# Patient Record
Sex: MALE | Race: WHITE | URBAN - METROPOLITAN AREA
[De-identification: names, ages, dates, MRNs, and addresses within clinical notes are randomized per-mention and may not be internally consistent; named-entity substitution may affect disease eponyms.]

---

## 2017-11-09 ENCOUNTER — OFFICE VISIT (OUTPATIENT)
Dept: FAMILY MEDICINE | Facility: CLINIC | Age: 47
End: 2017-11-09
Payer: COMMERCIAL

## 2017-11-09 VITALS
TEMPERATURE: 98.3 F | HEIGHT: 70 IN | HEART RATE: 90 BPM | WEIGHT: 206 LBS | DIASTOLIC BLOOD PRESSURE: 85 MMHG | OXYGEN SATURATION: 96 % | SYSTOLIC BLOOD PRESSURE: 125 MMHG | BODY MASS INDEX: 29.49 KG/M2

## 2017-11-09 DIAGNOSIS — F10.21 PERSONAL HISTORY OF ALCOHOLISM (H): ICD-10-CM

## 2017-11-09 DIAGNOSIS — N52.8 OTHER MALE ERECTILE DYSFUNCTION: ICD-10-CM

## 2017-11-09 DIAGNOSIS — Z00.00 ROUTINE GENERAL MEDICAL EXAMINATION AT A HEALTH CARE FACILITY: Primary | ICD-10-CM

## 2017-11-09 DIAGNOSIS — Z13.6 CARDIOVASCULAR SCREENING; LDL GOAL LESS THAN 160: ICD-10-CM

## 2017-11-09 DIAGNOSIS — Z23 NEED FOR IMMUNIZATION AGAINST INFLUENZA: ICD-10-CM

## 2017-11-09 PROBLEM — N52.9 ED (ERECTILE DYSFUNCTION): Status: ACTIVE | Noted: 2017-11-09

## 2017-11-09 LAB
ALBUMIN SERPL-MCNC: 3.9 G/DL (ref 3.4–5)
ALP SERPL-CCNC: 80 U/L (ref 40–150)
ALT SERPL W P-5'-P-CCNC: 33 U/L (ref 0–70)
ANION GAP SERPL CALCULATED.3IONS-SCNC: 8 MMOL/L (ref 3–14)
AST SERPL W P-5'-P-CCNC: 19 U/L (ref 0–45)
BASOPHILS # BLD AUTO: 0.1 10E9/L (ref 0–0.2)
BASOPHILS NFR BLD AUTO: 1 %
BILIRUB SERPL-MCNC: 0.8 MG/DL (ref 0.2–1.3)
BUN SERPL-MCNC: 14 MG/DL (ref 7–30)
CALCIUM SERPL-MCNC: 8.9 MG/DL (ref 8.5–10.1)
CHLORIDE SERPL-SCNC: 102 MMOL/L (ref 94–109)
CHOLEST SERPL-MCNC: 200 MG/DL
CO2 SERPL-SCNC: 29 MMOL/L (ref 20–32)
CREAT SERPL-MCNC: 1.2 MG/DL (ref 0.66–1.25)
DIFFERENTIAL METHOD BLD: ABNORMAL
EOSINOPHIL # BLD AUTO: 0.2 10E9/L (ref 0–0.7)
EOSINOPHIL NFR BLD AUTO: 2 %
ERYTHROCYTE [DISTWIDTH] IN BLOOD BY AUTOMATED COUNT: 13.1 % (ref 10–15)
GFR SERPL CREATININE-BSD FRML MDRD: 64 ML/MIN/1.7M2
GLUCOSE SERPL-MCNC: 85 MG/DL (ref 70–99)
HCT VFR BLD AUTO: 44.9 % (ref 40–53)
HDLC SERPL-MCNC: 56 MG/DL
HGB BLD-MCNC: 15.6 G/DL (ref 13.3–17.7)
LDLC SERPL CALC-MCNC: 130 MG/DL
LYMPHOCYTES # BLD AUTO: 6.2 10E9/L (ref 0.8–5.3)
LYMPHOCYTES NFR BLD AUTO: 57 %
MCH RBC QN AUTO: 30.8 PG (ref 26.5–33)
MCHC RBC AUTO-ENTMCNC: 34.7 G/DL (ref 31.5–36.5)
MCV RBC AUTO: 89 FL (ref 78–100)
MONOCYTES # BLD AUTO: 0.6 10E9/L (ref 0–1.3)
MONOCYTES NFR BLD AUTO: 6 %
NEUTROPHILS # BLD AUTO: 3.6 10E9/L (ref 1.6–8.3)
NEUTROPHILS NFR BLD AUTO: 34 %
NONHDLC SERPL-MCNC: 144 MG/DL
PLATELET # BLD AUTO: 194 10E9/L (ref 150–450)
POTASSIUM SERPL-SCNC: 4.5 MMOL/L (ref 3.4–5.3)
PROT SERPL-MCNC: 7.2 G/DL (ref 6.8–8.8)
RBC # BLD AUTO: 5.07 10E12/L (ref 4.4–5.9)
SODIUM SERPL-SCNC: 139 MMOL/L (ref 133–144)
TRIGL SERPL-MCNC: 69 MG/DL
VARIANT LYMPHS BLD QL SMEAR: PRESENT
WBC # BLD AUTO: 10.7 10E9/L (ref 4–11)

## 2017-11-09 PROCEDURE — 90471 IMMUNIZATION ADMIN: CPT | Performed by: INTERNAL MEDICINE

## 2017-11-09 PROCEDURE — 85025 COMPLETE CBC W/AUTO DIFF WBC: CPT | Performed by: INTERNAL MEDICINE

## 2017-11-09 PROCEDURE — 80061 LIPID PANEL: CPT | Performed by: INTERNAL MEDICINE

## 2017-11-09 PROCEDURE — 80053 COMPREHEN METABOLIC PANEL: CPT | Performed by: INTERNAL MEDICINE

## 2017-11-09 PROCEDURE — 90686 IIV4 VACC NO PRSV 0.5 ML IM: CPT | Performed by: INTERNAL MEDICINE

## 2017-11-09 PROCEDURE — 36415 COLL VENOUS BLD VENIPUNCTURE: CPT | Performed by: INTERNAL MEDICINE

## 2017-11-09 PROCEDURE — 99386 PREV VISIT NEW AGE 40-64: CPT | Mod: 25 | Performed by: INTERNAL MEDICINE

## 2017-11-09 RX ORDER — SILDENAFIL 50 MG/1
50 TABLET, FILM COATED ORAL DAILY PRN
Qty: 12 TABLET | Refills: 11 | Status: SHIPPED | OUTPATIENT
Start: 2017-11-09 | End: 2018-02-27

## 2017-11-09 NOTE — PATIENT INSTRUCTIONS
Preventive Health Recommendations  Male Ages 40 to 49    Yearly exam:             See your health care provider every year in order to  o   Review health changes.   o   Discuss preventive care.    o   Review your medicines if your doctor has prescribed any.    You should be tested each year for STDs (sexually transmitted diseases) if you re at risk.     Have a cholesterol test every 5 years.     Have a colonoscopy (test for colon cancer) if someone in your family has had colon cancer or polyps before age 50.     After age 45, have a diabetes test (fasting glucose). If you are at risk for diabetes, you should have this test every 3 years.      Talk with your health care provider about whether or not a prostate cancer screening test (PSA) is right for you.    Shots: Get a flu shot each year. Get a tetanus shot every 10 years.     Nutrition:    Eat at least 5 servings of fruits and vegetables daily.     Eat whole-grain bread, whole-wheat pasta and brown rice instead of white grains and rice.     Talk to your provider about Calcium and Vitamin D.     Lifestyle    Exercise for at least 150 minutes a week (30 minutes a day, 5 days a week). This will help you control your weight and prevent disease.     Limit alcohol to one drink per day.     No smoking.     Wear sunscreen to prevent skin cancer.     See your dentist every six months for an exam and cleaning.      At [facility], we strive to deliver an exceptional experience to you, every time we see you.  If you receive a survey in the mail, please send us back your thoughts. We really do value your feedback.    Based on your medical history, these are the current health maintenance/preventive care services that you are due for (some may have been done at this visit.)  Health Maintenance Due   Topic Date Due     TETANUS IMMUNIZATION (SYSTEM ASSIGNED)  04/20/1986     LIPID SCREEN Q5 YR MALE (SYSTEM ASSIGNED)  04/20/2003     INFLUENZA VACCINE (SYSTEM  ASSIGNED)  09/01/2017         Suggested websites for health information:  Www.fairview.org : Up to date and easily searchable information on multiple topics.  Www.medlineplus.gov : medication info, interactive tutorials, watch real surgeries online  Www.familydoctor.org : good info from the Academy of Family Physicians  Www.cdc.gov : public health info, travel advisories, epidemics (H1N1)  Www.aap.org : children's health info, normal development, vaccinations  Www.health.[facility].[place].us : [place] dept of health, public health issues in [place], N1N1    Your care team:                            Family Medicine Internal Medicine   MD Casimiro Navas, MD Humaira Li PA-C, MD Pediatrics   HORACIO Monroy, PORFIRIO Armando APRN CNP   MD Molly Jansen MD Deborah Mielke, MD Kim Thein, APRN CNP      Clinic hours: Monday - Thursday 7 am-7 pm; Fridays 7 am-5 pm.   Urgent care: Monday - Friday 11 am-9 pm; Saturday and Sunday 9 am-5 pm.  Pharmacy : Monday -Thursday 8 am-8 pm; Friday 8 am-6 pm; Saturday and Sunday 9 am-5 pm.     Clinic: [phone]   Pharmacy: [phone]

## 2017-11-09 NOTE — NURSING NOTE
Injectable Influenza Immunization Documentation    1.  Are you sick today? (Fever of 100.5 or higher on the day of the clinic)   No    2.  Have you ever had Guillain-[place] Syndrome within 6 weeks of an influenza vaccionation?  No    3. Do you have a life-threatening allergy to eggs?  No    4. Do you have a life-threatening allergy to a component of the vaccine? May include antibiotics, gelatin or latex.  No     5. Have you ever had a reaction to a dose of flu vaccine that needed immediate medical attention?  No     Form completed by Andrew Chacko MA

## 2017-11-09 NOTE — NURSING NOTE
"Chief Complaint   Patient presents with     Physical       Initial /85 (BP Location: Left arm, Patient Position: Chair, Cuff Size: Adult Regular)  Pulse 90  Temp 98.3  F (36.8  C) (Oral)  Ht 5' 10\" (1.778 m)  Wt 206 lb (93.4 kg)  SpO2 96%  BMI 29.56 kg/m2 Estimated body mass index is 29.56 kg/(m^2) as calculated from the following:    Height as of this encounter: 5' 10\" (1.778 m).    Weight as of this encounter: 206 lb (93.4 kg).  Medication Reconciliation: complete     Andrew Chacko MA      "

## 2017-11-09 NOTE — MR AVS SNAPSHOT
After Visit Summary   11/9/2017    Jean-Claude Meraz    MRN: [id number]           Patient Information     Date Of Birth          1970        Visit Information        Provider Department      11/9/2017 9:00 AM Casimiro Mattson MD [facility]        Today's Diagnoses     Routine general medical examination at a health care facility    -  1    Other male erectile dysfunction        CARDIOVASCULAR SCREENING; LDL GOAL LESS THAN 160          Care Instructions      Preventive Health Recommendations  Male Ages 40 to 49    Yearly exam:             See your health care provider every year in order to  o   Review health changes.   o   Discuss preventive care.    o   Review your medicines if your doctor has prescribed any.    You should be tested each year for STDs (sexually transmitted diseases) if you re at risk.     Have a cholesterol test every 5 years.     Have a colonoscopy (test for colon cancer) if someone in your family has had colon cancer or polyps before age 50.     After age 45, have a diabetes test (fasting glucose). If you are at risk for diabetes, you should have this test every 3 years.      Talk with your health care provider about whether or not a prostate cancer screening test (PSA) is right for you.    Shots: Get a flu shot each year. Get a tetanus shot every 10 years.     Nutrition:    Eat at least 5 servings of fruits and vegetables daily.     Eat whole-grain bread, whole-wheat pasta and brown rice instead of white grains and rice.     Talk to your provider about Calcium and Vitamin D.     Lifestyle    Exercise for at least 150 minutes a week (30 minutes a day, 5 days a week). This will help you control your weight and prevent disease.     Limit alcohol to one drink per day.     No smoking.     Wear sunscreen to prevent skin cancer.     See your dentist every six months for an exam and cleaning.      At [facility], we strive to deliver an  exceptional experience to you, every time we see you.  If you receive a survey in the mail, please send us back your thoughts. We really do value your feedback.    Based on your medical history, these are the current health maintenance/preventive care services that you are due for (some may have been done at this visit.)  Health Maintenance Due   Topic Date Due     TETANUS IMMUNIZATION (SYSTEM ASSIGNED)  04/20/1986     LIPID SCREEN Q5 YR MALE (SYSTEM ASSIGNED)  04/20/2003     INFLUENZA VACCINE (SYSTEM ASSIGNED)  09/01/2017         Suggested websites for health information:  Www.Re[employer].org : Up to date and easily searchable information on multiple topics.  Www.medlineplus.gov : medication info, interactive tutorials, watch real surgeries online  Www.familydoctor.org : good info from the Academy of Family Physicians  Www.cdc.gov : public health info, travel advisories, epidemics (H1N1)  Www.aap.org : children's health info, normal development, vaccinations  Www.health.[facility].[place].us : [place] dept of health, public health issues in [place], N1N1    Your care team:                            Family Medicine Internal Medicine   MD Casimiro Navas MD Shantel Branch-Fleming, MD Katya Georgiev PA-C Nam Ho, MD Pediatrics   HORACIO Monroy, PORFIRIO Armando APROSMANI CNP   MD Molly Jansen MD Deborah Mielke, MD Kim Thein, APRN [facility]      Clinic hours: Monday - Thursday 7 am-7 pm; Fridays 7 am-5 pm.   Urgent care: Monday - Friday 11 am-9 pm; Saturday and Sunday 9 am-5 pm.  Pharmacy : Monday -Thursday 8 am-8 pm; Friday 8 am-6 pm; Saturday and Sunday 9 am-5 pm.     Clinic: [phone]   Pharmacy: [phone]            Follow-ups after your visit        Who to contact     If you have questions or need follow up information about today's clinic visit or your schedule please contact [facility] directly at [phone].  Normal or non-critical lab and  "imaging results will be communicated to you by MyChart, letter or phone within 4 business days after the clinic has received the results. If you do not hear from us within 7 days, please contact the clinic through [employer] or phone. If you have a critical or abnormal lab result, we will notify you by phone as soon as possible.  Submit refill requests through [facility] [facility] or call your pharmacy and they will forward the refill request to us. Please allow 3 business days for your refill to be completed.          Additional Information About Your Visit        My Study [employer] Information     [facility] lets you send messages to your doctor, view your test results, renew your prescriptions, schedule appointments and more. To sign up, go to [email or web address] . Click on \"Log in\" on the left side of the screen, which will take you to the Welcome page. Then click on \"Sign up Now\" on the right side of the page.     You will be asked to enter the access code listed below, as well as some personal information. Please follow the directions to create your username and password.     Your access code is: [email or web address]  Expires: 2018  9:39 AM     Your access code will  in 90 days. If you need help or a new code, please call your [facility] clinic or [phone].        Care EveryWhere ID     This is your Care EveryWhere ID. This could be used by other organizations to access your [facility] medical records  [id number]        Your Vitals Were     Pulse Temperature Height Pulse Oximetry BMI (Body Mass Index)       90 98.3  F (36.8  C) (Oral) 5' 10\" (1.778 m) 96% 29.56 kg/m2        Blood Pressure from Last 3 Encounters:   17 125/85    Weight from Last 3 Encounters:   17 206 lb (93.4 kg)              We Performed the Following     CBC with platelets and differential     Comprehensive metabolic panel (BMP + Alb, Alk Phos, ALT, AST, Total. Bili, TP)     Lipid Profile (Chol, Trig, HDL, LDL calc)          Today's Medication Changes "          These changes are accurate as of: 11/9/17  9:39 AM.  If you have any questions, ask your nurse or doctor.               Start taking these medicines.        Dose/Directions    sildenafil 50 MG tablet   Commonly known as:  VIAGRA   Used for:  Other male erectile dysfunction   Started by:  Casimiro Mattson MD        Dose:  50 mg   Take 1 tablet (50 mg) by mouth daily as needed 30 min to 4 hrs before sex. Do not use with nitroglycerin, terazosin or doxazosin.   Quantity:  12 tablet   Refills:  11            Where to get your medicines      These medications were sent to [place] [address] [facility] [place] [address]  [address] [facility] [place] [address]     Phone:  [phone]     sildenafil 50 MG tablet                Primary Care Provider Office Phone # Fax #    Casimiro Mattson [phone]       [address] [place]  [place] [facility] [address]        Equal Access to Services     [facility] Joseph[facility] : Hadii cornell ku hadasho Soomaali, waaxda luqadaha, qaybta kaalmada adeegyada, geno chang hayaly klein . So [place] [phone].    ATENCIÓN: Si habla español, tiene a luque disposición servicios gratuitos de asistencia lingüística. Llame al [phone].    We comply with applicable federal civil rights laws and [place] laws. We do not discriminate on the basis of race, color, national origin, age, disability, sex, sexual orientation, or gender identity.            Thank you!     Thank you for choosing [facility]  for your care. Our goal is always to provide you with excellent care. Hearing back from our patients is one way we can continue to improve our services. Please take a few minutes to complete the written survey that you may receive in the mail after your visit with us. Thank you!             Your Updated Medication List - Protect others around you: Learn how to safely use, store and throw away your medicines at www.disposemymeds.org.          This  list is accurate as of: 11/9/17  9:39 AM.  Always use your most recent med list.                   Brand Name Dispense Instructions for use Diagnosis    sildenafil 50 MG tablet    VIAGRA    12 tablet    Take 1 tablet (50 mg) by mouth daily as needed 30 min to 4 hrs before sex. Do not use with nitroglycerin, terazosin or doxazosin.    Other male erectile dysfunction

## 2017-11-09 NOTE — PROGRESS NOTES
SUBJECTIVE:   CC: Jean-Claude Meraz is an 47 year old male who presents for preventative health visit.     Healthy Habits:    Do you get at least three servings of calcium containing foods daily (dairy, green leafy vegetables, etc.)? yes    Amount of exercise or daily activities, outside of work: 0 day(s) per week    Problems taking medications regularly No    Medication side effects: No    Have you had an eye exam in the past two years? yes    Do you see a dentist twice per year? 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 2017 (rehab)  -ADLs intact  -still has numbness at right thigh (lateral)  -chronic lung issues-susceptible to bronchitis and pneumonia during fall season  -but doesn't affect his running.        -------------------------------------    Today's PHQ-2 Score:   PHQ-2 ( 1999 Pfizer) 11/9/2017   Q1: Little interest or pleasure in doing things 0   Q2: Feeling down, depressed or hopeless 0   PHQ-2 Score 0         Abuse: Current or Past(Physical, Sexual or Emotional)- No  Do you feel safe in your environment - Yes  Social History   Substance Use Topics     Smoking status: Not on file     Smokeless tobacco: Not on file     Alcohol use Not on file     The patient does not drink >3 drinks per day nor >7 drinks per week.    Last PSA: No results found for: PSA    Reviewed orders with patient. Reviewed health maintenance and updated orders accordingly - Yes  Labs reviewed in EPIC  BP Readings from Last 3 Encounters:   11/09/17 125/85    Wt Readings from Last 3 Encounters:   11/09/17 206 lb (93.4 kg)                  Patient Active Problem List   Diagnosis     ED (erectile dysfunction)     History of colonic polyps     Lumbar back pain with radiculopathy affecting right  lower extremity     Personal history of alcoholism (H)     History reviewed. No pertinent surgical history.    Social History   Substance Use Topics     Smoking status: Never Smoker     Smokeless tobacco: Current User     Types: Chew     Alcohol use No     History reviewed. No pertinent family history.      Current Outpatient Prescriptions   Medication Sig Dispense Refill     sildenafil (VIAGRA) 50 MG tablet Take 1 tablet (50 mg) by mouth daily as needed 30 min to 4 hrs before sex. Do not use with nitroglycerin, terazosin or doxazosin. 12 tablet 11     Not on File  Recent Labs   Lab Test  11/09/17   0943   LDL  130*   HDL  56   TRIG  69   ALT  33   CR  1.20   GFRESTIMATED  64   GFRESTBLACK  78   POTASSIUM  4.5              Reviewed and updated as needed this visit by clinical staff         Reviewed and updated as needed this visit by Provider            ROS:  C: NEGATIVE for fever, chills, change in weight  I: NEGATIVE for worrisome rashes, moles or lesions  E: NEGATIVE for vision changes or irritation  ENT: NEGATIVE for ear, mouth and throat problems  R: NEGATIVE for significant cough or SOB  CV: NEGATIVE for chest pain, palpitations or peripheral edema  GI: NEGATIVE for nausea, abdominal pain, heartburn, or change in bowel habits   male: negative for dysuria, hematuria, decreased urinary stream, erectile dysfunction, urethral discharge  MUSCULOSKELETAL:As above.  N: NEGATIVE for weakness, dizziness or paresthesias  E: NEGATIVE for temperature intolerance, skin/hair changes  H: NEGATIVE for bleeding problems  P: NEGATIVE for changes in mood or affect    OBJECTIVE:   /85 (BP Location: Left arm, Patient Position: Chair, Cuff Size: Adult Regular)  EXAM:  GENERAL: healthy, alert and no distress  EYES: Eyes grossly normal to inspection, PERRL and conjunctivae and sclerae normal  HENT: ear canals and TM's normal, nose and mouth without ulcers or lesions  NECK: no adenopathy, no asymmetry, masses, or scars and  thyroid normal to palpation  RESP: lungs clear to auscultation - no rales, rhonchi or wheezes  CV: regular rate and rhythm, normal S1 S2, no S3 or S4, no murmur, click or rub, no peripheral edema and peripheral pulses strong  ABDOMEN: soft, nontender, no hepatosplenomegaly, no masses and bowel sounds normal  MS: no gross musculoskeletal defects noted, no edema  SKIN: no suspicious lesions or rashes  NEURO: Normal strength and tone, mentation intact and speech normal  PSYCH: mentation appears normal, affect normal/bright    ASSESSMENT/PLAN:   (Z00.00) Routine general medical examination at a health care facility  (primary encounter diagnosis)  Comment:   Plan: Comprehensive metabolic panel (BMP + Alb, Alk         Phos, ALT, AST, Total. Bili, TP), CBC with         platelets and differential            (N52.8) Other male erectile dysfunction  Comment:   Plan: sildenafil (VIAGRA) 50 MG tablet            (Z13.6) CARDIOVASCULAR SCREENING; LDL GOAL LESS THAN 160  Comment:   Plan: Lipid Profile (Chol, Trig, HDL, LDL calc)            (Z23) Need for immunization against influenza  Comment:   Plan: HC FLU VAC PRESRV FREE QUAD SPLIT VIR 3+YRS IM,        VACCINE ADMINISTRATION, INITIAL            (F10.21) Personal history of alcoholism (H)  Comment: Secondary to chronic low back pain and radiculopathy.  Plan: Patient is currently sober and does not take any medication for maintain abstinence.    COUNSELING:  Reviewed preventive health counseling, as reflected in patient instructions  Special attention given to:        Regular exercise       Healthy diet/nutrition    BP Screening:   Last 3 BP Readings:    BP Readings from Last 3 Encounters:   11/09/17 125/85       The following was recommended to the patient:  Re-screen BP within a year and recommended lifestyle modifications       has no tobacco history on file.      There is no height or weight on file to calculate BMI.   Weight management plan: diet and exercise.    Counseling  Resources:  ATP IV Guidelines  Pooled Cohorts Equation Calculator  FRAX Risk Assessment  ICSI Preventive Guidelines  Dietary Guidelines for Americans, 2010  USDA's MyPlate  ASA Prophylaxis  Lung CA Screening    Casimiro Mattson MD  [facility]

## 2017-11-10 PROBLEM — Z86.0100 HISTORY OF COLONIC POLYPS: Status: ACTIVE | Noted: 2017-11-10

## 2017-11-10 PROBLEM — F10.21 PERSONAL HISTORY OF ALCOHOLISM (H): Status: ACTIVE | Noted: 2017-11-10

## 2017-11-10 PROBLEM — M54.16 LUMBAR BACK PAIN WITH RADICULOPATHY AFFECTING RIGHT LOWER EXTREMITY: Status: ACTIVE | Noted: 2017-11-10

## 2017-11-12 RX ORDER — ALBUTEROL SULFATE 90 UG/1
1-2 AEROSOL, METERED RESPIRATORY (INHALATION)
COMMUNITY

## 2017-11-12 RX ORDER — METOPROLOL TARTRATE 25 MG/1
25 TABLET, FILM COATED ORAL
COMMUNITY
Start: 2016-12-05

## 2017-11-13 ENCOUNTER — TELEPHONE (OUTPATIENT)
Dept: FAMILY MEDICINE | Facility: CLINIC | Age: 47
End: 2017-11-13

## 2018-02-27 ENCOUNTER — TELEPHONE (OUTPATIENT)
Dept: FAMILY MEDICINE | Facility: CLINIC | Age: 48
End: 2018-02-27

## 2018-02-27 DIAGNOSIS — N52.8 OTHER MALE ERECTILE DYSFUNCTION: ICD-10-CM

## 2018-02-27 RX ORDER — SILDENAFIL 100 MG/1
50-100 TABLET, FILM COATED ORAL DAILY PRN
Qty: 12 TABLET | Refills: 5 | Status: SHIPPED | OUTPATIENT
Start: 2018-02-27 | End: 2019-04-26

## 2018-02-27 NOTE — TELEPHONE ENCOUNTER
Pending Prescriptions:                       Disp   Refills    sildenafil (VIAGRA) 50 MG tablet          12 tab*11           Sig: Take 1 tablet (50 mg) by mouth daily as needed 30           min to 4 hrs before sex. Do not use with           nitroglycerin, terazosin or doxazosin.    Patient needs refill but his insurance wont allow more, he is wondering about getting 100MG and cutting them in half    Call to advise  Thank you    Ok to leave detailed message  [phone]    He is out at this time

## 2018-02-28 NOTE — TELEPHONE ENCOUNTER
Notify patient that Rx for Viagra 100 mg, quantity of 12 tablets/30 days sent to patient's pharmacy.

## 2018-02-28 NOTE — TELEPHONE ENCOUNTER
Called Pt and informed him that his Viagra prescription was sent to the pharmacy.     Lissette Lovelace

## 2018-08-02 ENCOUNTER — OFFICE VISIT (OUTPATIENT)
Dept: FAMILY MEDICINE | Facility: CLINIC | Age: 48
End: 2018-08-02
Payer: COMMERCIAL

## 2018-08-02 VITALS
DIASTOLIC BLOOD PRESSURE: 88 MMHG | HEIGHT: 70 IN | OXYGEN SATURATION: 98 % | BODY MASS INDEX: 29.12 KG/M2 | WEIGHT: 203.4 LBS | RESPIRATION RATE: 16 BRPM | SYSTOLIC BLOOD PRESSURE: 138 MMHG | TEMPERATURE: 98.6 F | HEART RATE: 82 BPM

## 2018-08-02 DIAGNOSIS — F41.1 GAD (GENERALIZED ANXIETY DISORDER): ICD-10-CM

## 2018-08-02 DIAGNOSIS — R41.3 SHORT-TERM MEMORY LOSS: ICD-10-CM

## 2018-08-02 DIAGNOSIS — M54.16 LUMBAR BACK PAIN WITH RADICULOPATHY AFFECTING RIGHT LOWER EXTREMITY: ICD-10-CM

## 2018-08-02 DIAGNOSIS — Z00.00 ROUTINE GENERAL MEDICAL EXAMINATION AT A HEALTH CARE FACILITY: Primary | ICD-10-CM

## 2018-08-02 LAB
TSH SERPL DL<=0.005 MIU/L-ACNC: 3.58 MU/L (ref 0.4–4)
VIT B12 SERPL-MCNC: 646 PG/ML (ref 193–986)

## 2018-08-02 PROCEDURE — 36415 COLL VENOUS BLD VENIPUNCTURE: CPT | Performed by: INTERNAL MEDICINE

## 2018-08-02 PROCEDURE — 99396 PREV VISIT EST AGE 40-64: CPT | Performed by: INTERNAL MEDICINE

## 2018-08-02 PROCEDURE — 82607 VITAMIN B-12: CPT | Performed by: INTERNAL MEDICINE

## 2018-08-02 PROCEDURE — 99213 OFFICE O/P EST LOW 20 MIN: CPT | Mod: 25 | Performed by: INTERNAL MEDICINE

## 2018-08-02 PROCEDURE — 84443 ASSAY THYROID STIM HORMONE: CPT | Performed by: INTERNAL MEDICINE

## 2018-08-02 RX ORDER — CLONAZEPAM 0.5 MG/1
0.5 TABLET ORAL 2 TIMES DAILY PRN
Qty: 30 TABLET | Refills: 5 | Status: SHIPPED | OUTPATIENT
Start: 2018-08-02

## 2018-08-02 RX ORDER — MELOXICAM 7.5 MG/1
7.5 TABLET ORAL
Qty: 90 TABLET | Refills: 3 | Status: SHIPPED | OUTPATIENT
Start: 2018-08-02

## 2018-08-02 RX ORDER — CLONAZEPAM 0.5 MG/1
0.5 TABLET ORAL 2 TIMES DAILY PRN
Qty: 30 TABLET | Refills: 5 | Status: SHIPPED | OUTPATIENT
Start: 2018-08-02 | End: 2018-08-02

## 2018-08-02 ASSESSMENT — ANXIETY QUESTIONNAIRES
3. WORRYING TOO MUCH ABOUT DIFFERENT THINGS: MORE THAN HALF THE DAYS
5. BEING SO RESTLESS THAT IT IS HARD TO SIT STILL: MORE THAN HALF THE DAYS
6. BECOMING EASILY ANNOYED OR IRRITABLE: MORE THAN HALF THE DAYS
GAD7 TOTAL SCORE: 17
2. NOT BEING ABLE TO STOP OR CONTROL WORRYING: NEARLY EVERY DAY
7. FEELING AFRAID AS IF SOMETHING AWFUL MIGHT HAPPEN: MORE THAN HALF THE DAYS
1. FEELING NERVOUS, ANXIOUS, OR ON EDGE: NEARLY EVERY DAY
IF YOU CHECKED OFF ANY PROBLEMS ON THIS QUESTIONNAIRE, HOW DIFFICULT HAVE THESE PROBLEMS MADE IT FOR YOU TO DO YOUR WORK, TAKE CARE OF THINGS AT HOME, OR GET ALONG WITH OTHER PEOPLE: VERY DIFFICULT

## 2018-08-02 ASSESSMENT — PAIN SCALES - GENERAL: PAINLEVEL: EXTREME PAIN (8)

## 2018-08-02 ASSESSMENT — PATIENT HEALTH QUESTIONNAIRE - PHQ9: 5. POOR APPETITE OR OVEREATING: NEARLY EVERY DAY

## 2018-08-02 NOTE — TELEPHONE ENCOUNTER
Reason for Call:  Other prescription    Detailed comments: [id number] [place], [place] - [address] No longer accepts the patients insurance, BCBS, however the [facility] does. Please resend all RX from today to [id number] - [place], [place] - [address]  Call patient to notify him when it's  completed. Thanks.     Phone Number Patient can be reached at: Home number on file [phone] (home)    Best Time: anytime     Can we leave a detailed message on this number? YES    Call taken on 8/2/2018 at 11:29 AM by Shashank Larson

## 2018-08-02 NOTE — MR AVS SNAPSHOT
After Visit Summary   8/2/2018    Jean-Claude Meraz    MRN: [id number]           Patient Information     Date Of Birth          1970        Visit Information        Provider Department      8/2/2018 8:00 AM Casimiro Mattson MD [facility]        Today's Diagnoses     Routine general medical examination at a health care facility    -  1    SANA (generalized anxiety disorder)        CARDIOVASCULAR SCREENING; LDL GOAL LESS THAN 130        Lumbar back pain with radiculopathy affecting right lower extremity        Memory impairment          Care Instructions      Preventive Health Recommendations  Male Ages 50 - 64    Yearly exam:             See your health care provider every year in order to  o   Review health changes.   o   Discuss preventive care.    o   Review your medicines if your doctor has prescribed any.     Have a cholesterol test every 5 years, or more frequently if you are at risk for high cholesterol/heart disease.     Have a diabetes test (fasting glucose) every three years. If you are at risk for diabetes, you should have this test more often.     Have a colonoscopy at age 50, or have a yearly FIT test (stool test). These exams will check for colon cancer.      Talk with your health care provider about whether or not a prostate cancer screening test (PSA) is right for you.    You should be tested each year for STDs (sexually transmitted diseases), if you re at risk.     Shots: Get a flu shot each year. Get a tetanus shot every 10 years.     Nutrition:    Eat at least 5 servings of fruits and vegetables daily.     Eat whole-grain bread, whole-wheat pasta and brown rice instead of white grains and rice.     Get adequate Calcium and Vitamin D.     Lifestyle    Exercise for at least 150 minutes a week (30 minutes a day, 5 days a week). This will help you control your weight and prevent disease.     Limit alcohol to one drink per day.     No smoking.     Wear sunscreen  to prevent skin cancer.     See your dentist every six months for an exam and cleaning.     See your eye doctor every 1 to 2 years.    Ginkgo, Ginkgo biloba tablets and capsules  What is this medicine?  GINKGO (GING tim) is an herbal product or dietary supplement. It is promoted to improve the symptoms of Alzheimer's disease, memory loss, impaired mental performance, dizziness, ringing in the ears, and circulatory disorders. Due to the fact that many mental or circulatory symptoms could be serious if not properly diagnosed by a health care provider, self-treatment is not recommended. The FDA has not approved this herb for any medical use.  This herb may be used for other purposes; ask your health care provider or pharmacist if you have questions.  How should I use this medicine?  Take by mouth with a glass of water. Follow the directions on the package labeling or ask your health care professional. For best results take this supplement with food. Do not take this supplement more often than directed.  Contact your pediatrician regarding the use of this supplement in children. Special care may be needed.  What side effects may I notice from receiving this medicine?  Side effects that you should report to your doctor or health care professional as soon as possible:    allergic reactions like skin rash, itching or hives, swelling of the face, lips, or tongue    breathing problems    seizures    unusual bleeding, bruising  Side effects that usually do not require medical attention (report to your doctor or health care professional if they continue or are bothersome):    headache    nausea, vomiting    stomach gas, upset  What may interact with this medicine?  Check with your doctor or healthcare professional if you are taking any of the following medications:    aspirin and aspirin-like drugs    medicines for high blood pressure or heart problems like diltiazem, nifedipine, verapamil    medicines that treat or prevent  blood clots like enoxaparin, heparin, warfarin    NSAIDs, medicines for pain and inflammation, like ibuprofen or naproxen    trazodone  What if I miss a dose?  If you miss a dose, take it as soon as you can. If it is almost time for your next dose, take only that dose. Do not take double or extra doses.  Where should I keep my medicine?  Keep out of the reach of children.  Store at room temperature or as directed on the package label. Protect from moisture. Throw away any unused supplement after the expiration date.  What should I tell my health care provider before I take this medicine?  They need to know if you have any of these conditions:    Alzheimer's disease or other dementia    bleeding problems    diabetes    high blood pressure    seizure disorder    taking blood-thinner medicines    an unusual or allergic reaction to ginkgo, other herbs, plants, medicines, foods, dyes, or preservatives    pregnant or trying to get pregnant    breast-feeding  What should I watch for while using this medicine?  See your doctor if your symptoms do not get better or if they get worse.  If you are scheduled for any medical or dental procedure, tell your healthcare provider that you are taking this supplement. You may need to stop taking this supplement before the procedure.  Herbal or dietary supplements are not regulated like medicines. Rigid  standards are not required for dietary supplements. The purity and strength of these products can vary. The safety and effect of this dietary supplement for a certain disease or illness is not well known. This product is not intended to diagnose, treat, cure or prevent any disease.  The Food and Drug Administration suggests the following to help consumers protect themselves:    Always read product labels and follow directions.    Natural does not mean a product is safe for humans to take.    Look for products that include USP after the ingredient name. This means that the  " followed the standards of the [place] Pharmacopoeia.    Supplements made or sold by a nationally known food or drug company are more likely to be made under tight controls. You can write to the company for more information about how the product was made.  NOTE:This sheet is a summary. It may not cover all possible information. If you have questions about this medicine, talk to your doctor, pharmacist, or health care provider. Copyright  2018 Elsevier                Follow-ups after your visit        Who to contact     If you have questions or need follow up information about today's clinic visit or your schedule please contact [facility] directly at [phone].  Normal or non-critical lab and imaging results will be communicated to you by [employer], letter or phone within 4 business days after the clinic has received the results. If you do not hear from us within 7 days, please contact the clinic through [employer] or phone. If you have a critical or abnormal lab result, we will notify you by phone as soon as possible.  Submit refill requests through [employer] or call your pharmacy and they will forward the refill request to us. Please allow 3 business days for your refill to be completed.          Additional Information About Your Visit        [employer] Information     [employer] lets you send messages to your doctor, view your test results, renew your prescriptions, schedule appointments and more. To sign up, go to [email or web address] . Click on \"Log in\" on the left side of the screen, which will take you to the Welcome page. Then click on \"Sign up Now\" on the right side of the page.     You will be asked to enter the access code listed below, as well as some personal information. Please follow the directions to create your username and password.     Your access code is: [email or web address]  Expires: 10/31/2018  8:45 AM     Your access code will  in 90 days. If you need help or a new code, " "please call your [facility] clinic or [phone].        Care EveryWhere ID     This is your Care EveryWhere ID. This could be used by other organizations to access your [facility] medical records  [id number]        Your Vitals Were     Pulse Temperature Respirations Height Pulse Oximetry BMI (Body Mass Index)    82 98.6  F (37  C) (Oral) 16 5' 10.08\" (1.78 m) 98% 29.12 kg/m2       Blood Pressure from Last 3 Encounters:   08/02/18 (!) 140/92   11/09/17 125/85    Weight from Last 3 Encounters:   08/02/18 203 lb 6.4 oz (92.3 kg)   11/09/17 206 lb (93.4 kg)              We Performed the Following     TSH with free T4 reflex     Vitamin B12          Today's Medication Changes          These changes are accurate as of 8/2/18  8:45 AM.  If you have any questions, ask your nurse or doctor.               Start taking these medicines.        Dose/Directions    clonazePAM 0.5 MG tablet   Commonly known as:  klonoPIN   Used for:  SANA (generalized anxiety disorder)   Started by:  Casimiro Mattson MD        Dose:  0.5 mg   Take 1 tablet (0.5 mg) by mouth 2 times daily as needed for anxiety   Quantity:  30 tablet   Refills:  5       meloxicam 7.5 MG tablet   Commonly known as:  MOBIC   Used for:  Lumbar back pain with radiculopathy affecting right lower extremity   Started by:  Casimiro Mattson MD        Dose:  7.5 mg   Take 1 tablet (7.5 mg) by mouth daily (with breakfast)   Quantity:  90 tablet   Refills:  3       sertraline 50 MG tablet   Commonly known as:  ZOLOFT   Used for:  Lumbar back pain with radiculopathy affecting right lower extremity   Started by:  Casimiro Mattson MD        Dose:  50 mg   Take 1 tablet (50 mg) by mouth daily (with dinner)   Quantity:  90 tablet   Refills:  1            Where to get your medicines      These medications were sent to [place] [address] [facility] [place] - [address]  [address] [address]     Phone:  [phone]     meloxicam 7.5 MG tablet    " sertraline 50 MG tablet         Some of these will need a paper prescription and others can be bought over the counter.  Ask your nurse if you have questions.     Bring a paper prescription for each of these medications     clonazePAM 0.5 MG tablet                Primary Care Provider Office Phone # Fax #    Casimiro Mattson [phone]       [address]  [facility] [address]        Equal Access to Services     YOSEPH SUAZO : Hadii aad ku hadasho Soomaali, waaxda luqadaha, qaybta kaalmada adeegyada, waxay idiin hayaan adeeg khalbertsh laleslie . So [facility] [phone].    ATENCIÓN: Si papo guerra, tiene a luque disposición servicios gratuitos de asistencia lingüística. Llame al [phone].    We comply with applicable federal civil rights laws and [place] laws. We do not discriminate on the basis of race, color, national origin, age, disability, sex, sexual orientation, or gender identity.            Thank you!     Thank you for choosing [facility]  for your care. Our goal is always to provide you with excellent care. Hearing back from our patients is one way we can continue to improve our services. Please take a few minutes to complete the written survey that you may receive in the mail after your visit with us. Thank you!             Your Updated Medication List - Protect others around you: Learn how to safely use, store and throw away your medicines at www.disposemymeds.org.          This list is accurate as of 8/2/18  8:45 AM.  Always use your most recent med list.                   Brand Name Dispense Instructions for use Diagnosis    albuterol 108 (90 Base) MCG/ACT Inhaler    PROAIR HFA/PROVENTIL HFA/VENTOLIN HFA     Inhale 1-2 puffs into the lungs        clonazePAM 0.5 MG tablet    klonoPIN    30 tablet    Take 1 tablet (0.5 mg) by mouth 2 times daily as needed for anxiety    SANA (generalized anxiety disorder)       meloxicam 7.5 MG tablet    MOBIC    90 tablet     Take 1 tablet (7.5 mg) by mouth daily (with breakfast)    Lumbar back pain with radiculopathy affecting right lower extremity       metoprolol tartrate 25 MG tablet    LOPRESSOR     Take 25 mg by mouth        sertraline 50 MG tablet    ZOLOFT    90 tablet    Take 1 tablet (50 mg) by mouth daily (with dinner)    Lumbar back pain with radiculopathy affecting right lower extremity       sildenafil 100 MG tablet    VIAGRA    12 tablet    Take 0.5-1 tablets ( mg) by mouth daily as needed 30 min to 4 hrs before sex. Do not use with nitroglycerin, terazosin or doxazosin.    Other male erectile dysfunction

## 2018-08-02 NOTE — TELEPHONE ENCOUNTER
Reason for Call:  Medication or medication refill:    Do you use a [facility]?  Name of the pharmacy and phone number for the current request:  [facility]    Name of the medication requested: Pending Prescriptions:                       Disp   Refills    clonazePAM (KLONOPIN) 0.5 MG tablet       30 tab*5            Sig: Take 1 tablet (0.5 mg) by mouth 2 times daily as           needed for anxiety    Other request: All other scripts received except this one. Please send to us.    Can we leave a detailed message on this number? YES    Phone number [facility] can be reached at: [phone]    Best Time: any    Call taken on 8/2/2018 at 12:47 PM by Sophie Rizo

## 2018-08-02 NOTE — TELEPHONE ENCOUNTER
Written rx faxed to the pharmacy, Pharmacy will contact pt when medication is ready for pickup.  Aldair Joseph,  [employer] [facility]

## 2018-08-02 NOTE — PATIENT INSTRUCTIONS
Preventive Health Recommendations  Male Ages 50   64    Yearly exam:             See your health care provider every year in order to  o   Review health changes.   o   Discuss preventive care.    o   Review your medicines if your doctor has prescribed any.     Have a cholesterol test every 5 years, or more frequently if you are at risk for high cholesterol/heart disease.     Have a diabetes test (fasting glucose) every three years. If you are at risk for diabetes, you should have this test more often.     Have a colonoscopy at age 50, or have a yearly FIT test (stool test). These exams will check for colon cancer.      Talk with your health care provider about whether or not a prostate cancer screening test (PSA) is right for you.    You should be tested each year for STDs (sexually transmitted diseases), if you re at risk.     Shots: Get a flu shot each year. Get a tetanus shot every 10 years.     Nutrition:    Eat at least 5 servings of fruits and vegetables daily.     Eat whole-grain bread, whole-wheat pasta and brown rice instead of white grains and rice.     Get adequate Calcium and Vitamin D.     Lifestyle    Exercise for at least 150 minutes a week (30 minutes a day, 5 days a week). This will help you control your weight and prevent disease.     Limit alcohol to one drink per day.     No smoking.     Wear sunscreen to prevent skin cancer.     See your dentist every six months for an exam and cleaning.     See your eye doctor every 1 to 2 years.    Ginkgo, Ginkgo biloba tablets and capsules  What is this medicine?  GINKGO (GING tim) is an herbal product or dietary supplement. It is promoted to improve the symptoms of Alzheimer's disease, memory loss, impaired mental performance, dizziness, ringing in the ears, and circulatory disorders. Due to the fact that many mental or circulatory symptoms could be serious if not properly diagnosed by a health care provider, self-treatment is not recommended. The FDA has  not approved this herb for any medical use.  This herb may be used for other purposes; ask your health care provider or pharmacist if you have questions.  How should I use this medicine?  Take by mouth with a glass of water. Follow the directions on the package labeling or ask your health care professional. For best results take this supplement with food. Do not take this supplement more often than directed.  Contact your pediatrician regarding the use of this supplement in children. Special care may be needed.  What side effects may I notice from receiving this medicine?  Side effects that you should report to your doctor or health care professional as soon as possible:    allergic reactions like skin rash, itching or hives, swelling of the face, lips, or tongue    breathing problems    seizures    unusual bleeding, bruising  Side effects that usually do not require medical attention (report to your doctor or health care professional if they continue or are bothersome):    headache    nausea, vomiting    stomach gas, upset  What may interact with this medicine?  Check with your doctor or healthcare professional if you are taking any of the following medications:    aspirin and aspirin-like drugs    medicines for high blood pressure or heart problems like diltiazem, nifedipine, verapamil    medicines that treat or prevent blood clots like enoxaparin, heparin, warfarin    NSAIDs, medicines for pain and inflammation, like ibuprofen or naproxen    trazodone  What if I miss a dose?  If you miss a dose, take it as soon as you can. If it is almost time for your next dose, take only that dose. Do not take double or extra doses.  Where should I keep my medicine?  Keep out of the reach of children.  Store at room temperature or as directed on the package label. Protect from moisture. Throw away any unused supplement after the expiration date.  What should I tell my health care provider before I take this medicine?  They need  to know if you have any of these conditions:    Alzheimer's disease or other dementia    bleeding problems    diabetes    high blood pressure    seizure disorder    taking blood-thinner medicines    an unusual or allergic reaction to ginkgo, other herbs, plants, medicines, foods, dyes, or preservatives    pregnant or trying to get pregnant    breast-feeding  What should I watch for while using this medicine?  See your doctor if your symptoms do not get better or if they get worse.  If you are scheduled for any medical or dental procedure, tell your healthcare provider that you are taking this supplement. You may need to stop taking this supplement before the procedure.  Herbal or dietary supplements are not regulated like medicines. Rigid  standards are not required for dietary supplements. The purity and strength of these products can vary. The safety and effect of this dietary supplement for a certain disease or illness is not well known. This product is not intended to diagnose, treat, cure or prevent any disease.  The Food and Drug Administration suggests the following to help consumers protect themselves:    Always read product labels and follow directions.    Natural does not mean a product is safe for humans to take.    Look for products that include USP after the ingredient name. This means that the  followed the standards of the [place] Pharmacopoeia.    Supplements made or sold by a nationally known food or drug company are more likely to be made under tight controls. You can write to the company for more information about how the product was made.  NOTE:This sheet is a summary. It may not cover all possible information. If you have questions about this medicine, talk to your doctor, pharmacist, or health care provider. Copyright  2018 Elsevier

## 2018-08-02 NOTE — TELEPHONE ENCOUNTER
Patient contacted and informed. He states he received a phone call from the pharmacy already just haven't picked up  Ricco Glaser CMA

## 2018-08-02 NOTE — PROGRESS NOTES
SUBJECTIVE:   CC: Jean-Claude Meraz is an 48 year old male who presents for preventative health visit.     Healthy Habits:    Do you get at least three servings of calcium containing foods daily (dairy, green leafy vegetables, etc.)? no, taking calcium and/or vitamin D supplement: no    Amount of exercise or daily activities, outside of work: 5-7 day(s) per week    Problems taking medications regularly No    Medication side effects: No    Have you had an eye exam in the past two years? no    Do you see a dentist twice per year? 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 Another source of stress is his worsening short-term memory. Patient has history of alcoholism.    Therapies tried and outcome: none      Today's PHQ-2 Score:   PHQ-2 ( 1999 [employer]) 11/9/2017   Q1: Little interest or pleasure in doing things 0   Q2: Feeling down, depressed or hopeless 0   PHQ-2 Score 0       Abuse: Current or Past(Physical, Sexual or Emotional)- No  Do you feel safe in your environment - Yes    Social History   Substance Use Topics     Smoking status: Never Smoker     Smokeless tobacco: Current User     Types: Chew     Alcohol use No      If you drink alcohol do you typically have >3 drinks per day or >7 drinks per week? No                      Last PSA: No results found for: PSA    Reviewed orders with patient. Reviewed health maintenance and updated orders accordingly - Yes  Labs reviewed in EPIC  BP Readings from Last 3 Encounters:   08/02/18 (!) 140/92   11/09/17 125/85    Wt Readings from Last 3 Encounters:   08/02/18 203 lb 6.4 oz (92.3 kg)   11/09/17  "206 lb (93.4 kg)                  Patient Active Problem List   Diagnosis     ED (erectile dysfunction)     History of colonic polyps     Lumbar back pain with radiculopathy affecting right lower extremity     Personal history of alcoholism (H)     History reviewed. No pertinent surgical history.    Social History   Substance Use Topics     Smoking status: Never Smoker     Smokeless tobacco: Current User     Types: Chew     Alcohol use No     Family History   Problem Relation Age of Onset     Chronic Obstructive Pulmonary Disease Mother      Muscular Dystrophy Father          No Known Allergies  Recent Labs   Lab Test  11/09/17   0943   LDL  130*   HDL  56   TRIG  69   ALT  33   CR  1.20   GFRESTIMATED  64   GFRESTBLACK  78   POTASSIUM  4.5        Reviewed and updated as needed this visit by clinical staff         Reviewed and updated as needed this visit by Provider            ROS:  CONSTITUTIONAL: NEGATIVE for fever, chills, change in weight  INTEGUMENTARY/SKIN: NEGATIVE for worrisome rashes, moles or lesions  EYES: NEGATIVE for vision changes or irritation  ENT: NEGATIVE for ear, mouth and throat problems  RESP: NEGATIVE for significant cough or SOB  CV: NEGATIVE for chest pain, palpitations or peripheral edema  GI: NEGATIVE for nausea, abdominal pain, heartburn, or change in bowel habits   male: negative for dysuria, hematuria, decreased urinary stream, erectile dysfunction, urethral discharge  MUSCULOSKELETAL: NEGATIVE for significant arthralgias or myalgia  NEURO: NEGATIVE for weakness, dizziness or paresthesias  PSYCHIATRIC: NEGATIVE for changes in mood or affect    OBJECTIVE:   /88  Pulse 82  Temp 98.6  F (37  C) (Oral)  Resp 16  Ht 5' 10.08\" (1.78 m)  Wt 203 lb 6.4 oz (92.3 kg)  SpO2 98%  BMI 29.12 kg/m2  EXAM:  GENERAL: healthy, alert and no distress  EYES: Eyes grossly normal to inspection, PERRL and conjunctivae and sclerae normal  HENT: ear canals and TM's normal, nose and mouth without " "ulcers or lesions  NECK: no adenopathy, no asymmetry, masses, or scars and thyroid normal to palpation  RESP: lungs clear to auscultation - no rales, rhonchi or wheezes  CV: regular rate and rhythm, normal S1 S2, no S3 or S4, no murmur, click or rub, no peripheral edema and peripheral pulses strong  ABDOMEN: soft, nontender, no hepatosplenomegaly, no masses and bowel sounds normal  MS: no gross musculoskeletal defects noted, no edema  SKIN: no suspicious lesions or rashes  NEURO: Normal strength and tone, mentation intact and speech normal  PSYCH: mentation appears normal, affect normal/bright    Diagnostic Test Results:  Results for orders placed or performed in visit on 08/02/18   TSH with free T4 reflex   Result Value Ref Range    TSH 3.58 0.40 - 4.00 mU/L   Vitamin B12   Result Value Ref Range    Vitamin B12 646 193 - 986 pg/mL       ASSESSMENT/PLAN:   (Z00.00) Routine general medical examination at a health care facility  (primary encounter diagnosis)  Comment:   Plan: TSH with free T4 reflex, Vitamin B12            (F41.1) SANA (generalized anxiety disorder)  Comment:   Plan: sertraline (ZOLOFT) 50 MG tablet, DISCONTINUED:        clonazePAM (KLONOPIN) 0.5 MG tablet            (R41.3) Short-term memory loss  Comment:   Plan: TSH with free T4 reflex, Vitamin B12            (M54.17) Lumbar back pain with radiculopathy affecting right lower extremity  Comment:   Plan: sertraline (ZOLOFT) 50 MG tablet, meloxicam         (MOBIC) 7.5 MG tablet            COUNSELING:  Special attention given to:        Regular exercise       Healthy diet/nutrition       Alcohol Use    BP Readings from Last 1 Encounters:   11/09/17 125/85     Estimated body mass index is 29.56 kg/(m^2) as calculated from the following:    Height as of 11/9/17: 5' 10\" (1.778 m).    Weight as of 11/9/17: 206 lb (93.4 kg).    BP Screening:   Last 3 BP Readings:    BP Readings from Last 3 Encounters:   08/02/18 138/88   11/09/17 125/85       The following " was recommended to the patient:  Re-screen BP within a year and recommended lifestyle modifications  Weight management plan: diet and exercise.     reports that he has never smoked. His smokeless tobacco use includes Chew.      Counseling Resources:  ATP IV Guidelines  Pooled Cohorts Equation Calculator  FRAX Risk Assessment  ICSI Preventive Guidelines  Dietary Guidelines for Americans, 2010  USDA's MyPlate  ASA Prophylaxis  Lung CA Screening    Casimiro Mattson MD  [facility]

## 2018-08-03 ASSESSMENT — PATIENT HEALTH QUESTIONNAIRE - PHQ9: SUM OF ALL RESPONSES TO PHQ QUESTIONS 1-9: 22

## 2018-08-03 ASSESSMENT — ANXIETY QUESTIONNAIRES: GAD7 TOTAL SCORE: 17

## 2019-04-26 DIAGNOSIS — N52.8 OTHER MALE ERECTILE DYSFUNCTION: ICD-10-CM

## 2019-04-26 NOTE — TELEPHONE ENCOUNTER
"Requested Prescriptions   Pending Prescriptions Disp Refills     sildenafil (VIAGRA) 100 MG tablet [Pharmacy Med Name: Sildenafil Citrate Oral Tablet 100 MG]  Last Written Prescription Date:  02/27/18  Last Fill Quantity: 12,  # refills: 5   Last Office Visit with [facility], P or [facility] prescribing provider:  08/02/18-Vocal   Future Office Visit:    6 tablet 1     Sig: Take 1/2 to 1 tablet by mOuth daily as needed. TakE 30 min to 4 hours priorTo intercourse. Do not tAke with nitroglycerin, tErazosin, or dox       Erectile Dysfuction Protocol Passed - 4/26/2019 12:59 PM        Passed - Absence of nitrates on medication list        Passed - Absence of Alpha Blockers on Med list        Passed - Recent (12 mo) or future (30 days) visit within the authorizing provider's specialty     Patient had office visit in the last 12 months or has a visit in the next 30 days with authorizing provider or within the authorizing provider's specialty.  See \"Patient Info\" tab in inbasket, or \"Choose Columns\" in Meds & Orders section of the refill encounter.              Passed - Medication is active on med list        Passed - Patient is age 18 or older          "

## 2019-04-29 RX ORDER — SILDENAFIL 100 MG/1
TABLET, FILM COATED ORAL
Qty: 6 TABLET | Refills: 1 | Status: SHIPPED | OUTPATIENT
Start: 2019-04-29

## 2019-04-29 NOTE — TELEPHONE ENCOUNTER
Prescription approved per [facility] Refill Protocol.      Ilya Burgess RN, BSN

## 2019-07-25 ENCOUNTER — TELEPHONE (OUTPATIENT)
Dept: FAMILY MEDICINE | Facility: CLINIC | Age: 49
End: 2019-07-25

## 2019-07-25 NOTE — TELEPHONE ENCOUNTER
Panel Management Review   One phone call and send letter if unable to reach them or Masherhart message and send letter if not read after 2 weeks (You will get a message to your inbasket)      BP Readings from Last 1 Encounters:   08/02/18 138/88    , No results found for: A1C, Visit date not found    Health Maintenance Due   Topic Date Due     COLONOSCOPY  04/20/1978     HIV SCREENING  04/20/1983     ZOSTER IMMUNIZATION (1 of 2) 04/20/2018     PHQ-9  02/02/2019     PREVENTIVE CARE VISIT  08/02/2019        Fail List measure: colon cancer and depression         Patient is due/failing the following:   COLONOSCOPY and PHQ9    Action needed:   Patient needs to do PHQ9.    Type of outreach:    Sent letter.    Questions for provider review:    None                                                                                       Chart routed to n/john Middleton

## 2019-07-25 NOTE — LETTER
[facility]  [address]  [place] [address]      07/25/19      Jean-Claude Meraz  [address]  [place] [address]          Dear Jean-Claude Meraz     At [facility], we care about your health and are committed to providing quality patient care, which includes staying current on your depression management and having a current Patient Health Questionnaire (PHQ9) on file for you. We ask patiens to recheck this every 6 months if stable and more often if symptoms are not ideally controlled. The PHQ9 is a series of questions which helps your doctor with diagnosing depression and monitoring treatment response. This can also help track a patients overall depression severity as well as the specific symptoms that are improving or not with treatment.    Our records indicated that you need your PHQ9 updated. Please complete the attached questionnaire and return it to us at your earliest convenience or please call us at [phone] so that we could update your PHQ9. If you have transferred care to another clinic please call us so that we do not continue to send you reminder letters.    Please contact us if you have additional questions or concerns. Thank you for choosing [facility] for you care. We appreciate the opportunity to serve you and look forward to supporting your healthcare needs in the future.    If you have any questions or concerns, please call myself or my nurse at [phone].      Sincerely,      Casimiro Mattson MD/dariela              Dear Jean-Claude Meraz,      At [place] we care about your health and are committed to providing quality patient care, which includes staying current on preventative cancer screenings.  You can increase your chances of finding and treating cancers through regular screenings.      Our records show that you are due for the following screening(s):    Colonoscopy for colon cancer  Specialty Schedule Number [phone]  Recommended every ten  years for everyone age 50 and older  We strongly urge our patient's to consider having a colonoscopy done, which is the best screening test available and only needs to be done every 10 years if normal.      Other option is that you can do a FIT and this is once a year.  Any questions or concern, please contact us at [facility] at [phone].    You may contact the closest location to schedule the screening test(s) at your earliest convenience.    If you have already had one or all of the above screening tests at another facility, please call us so that we may update your chart.      Sincerely,    Quality Committee at [facility]/dariela

## 2019-08-01 NOTE — TELEPHONE ENCOUNTER
This writer attempted to contact patient on 08/01/19      Reason for call PHQ9 and left message.      If patient calls back:   1st floor [place] [facility] Care Team (MA/TC) called. Inform patient that someone from the team will contact them, document that pt called and route to care team.         Stephania Middleton

## 2020-01-09 ENCOUNTER — TELEPHONE (OUTPATIENT)
Dept: FAMILY MEDICINE | Facility: CLINIC | Age: 50
End: 2020-01-09

## 2020-01-09 NOTE — TELEPHONE ENCOUNTER
Panel Management Review   One phone call and send letter if unable to reach them or MyChart message and send letter if not read after 2 weeks (You will get a message to your inbasket)      BP Readings from Last 1 Encounters:   08/02/18 138/88        Health Maintenance Due   Topic Date Due     COLONOSCOPY  04/20/1978     HIV SCREENING  04/20/1983     ZOSTER IMMUNIZATION (1 of 2) 04/20/2018     PHQ-9  02/02/2019     PREVENTIVE CARE VISIT  08/02/2019     INFLUENZA VACCINE (1) 09/01/2019     DTAP/TDAP/TD IMMUNIZATION (2 - Td) 09/08/2019        Fail List measure: BMI        Patient is due/failing the following:   BMI    Action needed:   Patient needs office visit for Preventive Care Visit.    Type of outreach:    Phone, left message for patient to call back.     Questions for provider review:    None                                                                                                                    Jennifer Morrison